# Patient Record
Sex: FEMALE | Race: WHITE | Employment: PART TIME | URBAN - METROPOLITAN AREA
[De-identification: names, ages, dates, MRNs, and addresses within clinical notes are randomized per-mention and may not be internally consistent; named-entity substitution may affect disease eponyms.]

---

## 2023-12-11 ENCOUNTER — HOSPITAL ENCOUNTER (EMERGENCY)
Facility: HOSPITAL | Age: 33
Discharge: HOME | End: 2023-12-11
Payer: COMMERCIAL

## 2023-12-11 ENCOUNTER — APPOINTMENT (OUTPATIENT)
Facility: HOSPITAL | Age: 33
End: 2023-12-11
Payer: COMMERCIAL

## 2023-12-11 VITALS
HEART RATE: 77 BPM | TEMPERATURE: 99.1 F | BODY MASS INDEX: 33.79 KG/M2 | OXYGEN SATURATION: 100 % | SYSTOLIC BLOOD PRESSURE: 113 MMHG | DIASTOLIC BLOOD PRESSURE: 84 MMHG | HEIGHT: 62 IN | RESPIRATION RATE: 16 BRPM | WEIGHT: 183.64 LBS

## 2023-12-11 DIAGNOSIS — M79.602 LEFT ARM PAIN: ICD-10-CM

## 2023-12-11 DIAGNOSIS — M25.561 ACUTE PAIN OF RIGHT KNEE: ICD-10-CM
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
## 2023-12-11 PROCEDURE — 99283 EMERGENCY DEPT VISIT LOW MDM: CPT

## 2023-12-11 PROCEDURE — 73060 X-RAY EXAM OF HUMERUS: CPT

## 2023-12-11 PROCEDURE — 73562 X-RAY EXAM OF KNEE 3: CPT

## 2023-12-11 RX ORDER — METHOCARBAMOL 750 MG/1
750 TABLET, FILM COATED ORAL 4 TIMES DAILY
Qty: 40 TABLET | Refills: 0 | Status: SHIPPED | OUTPATIENT
Start: 2023-12-11 | End: 2023-12-21

## 2023-12-11 RX ORDER — NAPROXEN 500 MG/1
500 TABLET ORAL 2 TIMES DAILY WITH MEALS
Qty: 20 TABLET | Refills: 0 | Status: SHIPPED | OUTPATIENT
Start: 2023-12-11 | End: 2023-12-21

## 2023-12-11 NOTE — DISCHARGE INSTRUCTIONS
Local Primary Care Physicians  [facility] [phone]  MD Maria Antonia Salazar, MD Carlos Espinoza, MD Letitia Lindo, [address], [address], [facility] [phone]  Dean Britt, MD Katlin Mead, NP  Eusebio Rosales, [facility]  Kristina Olivier, [address] [facility] [phone]  MD Asad Suarez MD Mac Conradi, MD Nguyen Zavalla, [address] [phone]  Iesha Montesinos, MD Mackenzie Ybarra, MD Olivier Gilliland, MD Saloni Mazariegos, MD Firman Sever, MD Cheryl Mccann, [address] [facility] [phone]  Rodolfo Mathur, MD Odell Lazo, NP  Staci Goff, NP [address] [facility] [phone]  FMPT \"[id number]\" MD Lena Purdy, MD Jane Smith, MD Basim Zavaleta, CNP  Darron Canchola, CNP  Nafisa Tan, PA-C   [facility] [phone]  MD Tressa Cordero, MD Rosalie Aiken, CNP  Kandy Gutierrez, [address] [facility] [phone]  MD Frederick Pizano, MD [facility], MD Ro Justin, MD Treva Smith PA-C   [facility] [phone]  Ananya Reyes MD  [facility], MD Landon Crisostomo MD Zelma Lark, MD Rosebud Friendly, [facility]  Edelmira Olguin, [facility]  Yamilet Powell, [facility]  [facility] [phone]  MD Patricia Ornelas MD Paulina Pan, MD Anibal Rhody, MD Garnett Pinto, MD Annella Marshall, MD Benigno Mohs, MD Ulus Shave, MD   [facility] [phone]  Mateus Zuniga, MD Nate Pisano, CNP  Diamante Cali, CNP  Rajan Dinhrere, [address] [facility] [phone]  MD Daniel Blake MD Pandora Alar, PA-C  Sean Alcantara, PA-C     [facility] [phone]  [address] [place] [address]
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
## 2023-12-11 NOTE — ED NOTES
I have reviewed the provider's instructions with the patient, answering all questions to her satisfaction.  Pt ambulatory to waiting room        Fred Ribeiro RN  12/11/23 [id number]